# Patient Record
Sex: MALE | Race: WHITE | NOT HISPANIC OR LATINO | URBAN - METROPOLITAN AREA
[De-identification: names, ages, dates, MRNs, and addresses within clinical notes are randomized per-mention and may not be internally consistent; named-entity substitution may affect disease eponyms.]

---

## 2019-02-26 ENCOUNTER — OUTPATIENT (OUTPATIENT)
Dept: OUTPATIENT SERVICES | Facility: HOSPITAL | Age: 34
LOS: 1 days | Discharge: HOME | End: 2019-02-26

## 2019-02-26 DIAGNOSIS — R07.9 CHEST PAIN, UNSPECIFIED: ICD-10-CM

## 2024-08-22 PROBLEM — Z00.00 ENCOUNTER FOR PREVENTIVE HEALTH EXAMINATION: Status: ACTIVE | Noted: 2024-08-22

## 2024-08-27 ENCOUNTER — APPOINTMENT (OUTPATIENT)
Dept: UROLOGY | Facility: CLINIC | Age: 39
End: 2024-08-27
Payer: COMMERCIAL

## 2024-08-27 VITALS
HEART RATE: 102 BPM | DIASTOLIC BLOOD PRESSURE: 79 MMHG | BODY MASS INDEX: 31.39 KG/M2 | WEIGHT: 200 LBS | HEIGHT: 67 IN | SYSTOLIC BLOOD PRESSURE: 132 MMHG | OXYGEN SATURATION: 96 %

## 2024-08-27 DIAGNOSIS — Z80.8 FAMILY HISTORY OF MALIGNANT NEOPLASM OF OTHER ORGANS OR SYSTEMS: ICD-10-CM

## 2024-08-27 DIAGNOSIS — N20.0 CALCULUS OF KIDNEY: ICD-10-CM

## 2024-08-27 DIAGNOSIS — Z80.1 FAMILY HISTORY OF MALIGNANT NEOPLASM OF TRACHEA, BRONCHUS AND LUNG: ICD-10-CM

## 2024-08-27 DIAGNOSIS — Z83.3 FAMILY HISTORY OF DIABETES MELLITUS: ICD-10-CM

## 2024-08-27 DIAGNOSIS — Z82.5 FAMILY HISTORY OF ASTHMA AND OTHER CHRONIC LOWER RESPIRATORY DISEASES: ICD-10-CM

## 2024-08-27 DIAGNOSIS — Z80.52 FAMILY HISTORY OF MALIGNANT NEOPLASM OF BLADDER: ICD-10-CM

## 2024-08-27 PROCEDURE — 99203 OFFICE O/P NEW LOW 30 MIN: CPT

## 2024-08-28 RX ORDER — TOPIRAMATE 100 MG/1
100 TABLET, FILM COATED ORAL
Refills: 0 | Status: ACTIVE | COMMUNITY

## 2024-08-28 NOTE — PHYSICAL EXAM
[Normal Appearance] : normal appearance [General Appearance - In No Acute Distress] : no acute distress [Abdomen Soft] : soft [Abdomen Tenderness] : non-tender [Normal Station and Gait] : the gait and station were normal for the patient's age [Oriented To Time, Place, And Person] : oriented to person, place, and time [Affect] : the affect was normal

## 2024-09-02 NOTE — HISTORY OF PRESENT ILLNESS
[FreeTextEntry1] : 38-year-old male who comes in for further evaluation of renal stones. Patient had an episode last week of right lower abdominal pain and lower mid back pain which lasted approximately 35minutes. The pain spontaneously resolved, and he has had no pain since. He saw a GI doctor who ordered an abdominal ultrasound which shows bilateral renal stones. He denies any history of kidney stones, dysuria, hematuria, or flank pain.  PMHx: none PSHx: hernia repair Allergies: NKDA Family Hx: father kidney stones   US kidney and bladder 8/24/2024: - Bilateral nonobstructing renal calculi with no hydronephrosis.   Labs 8/23/2024: BUN 18 Cr 1.04 HbA1C 5.0 PSA 0.35

## 2024-09-02 NOTE — ASSESSMENT
[FreeTextEntry1] : 38-year-old male seen for bilateral renal stones seen on ultrasound. It is unclear if his pain was related to the stones or if he was passing a small stone at that time. We had a long discussion regarding the ultrasound findings and the unreliability of ultrasounds for stones.US/Labs reviewewd.  He is scheduled for a CT scan in September but he is unsure if this is with contrast or not. I've provided him with a script for a non-contrast CT scan to ensure we get non-contrast images to better evaluate the stones. He will follow up after the CT scan to discuss findings. If he has any significant pain, nausea/vomiting, or fevers, he will go to the ER to get a CT scan urgently. All of their questions were otherwise answered. Seen with KAYLYNN Avelar. Total time=30 min.

## 2024-09-16 ENCOUNTER — APPOINTMENT (OUTPATIENT)
Dept: GASTROENTEROLOGY | Facility: CLINIC | Age: 39
End: 2024-09-16

## 2024-09-30 ENCOUNTER — APPOINTMENT (OUTPATIENT)
Dept: UROLOGY | Facility: CLINIC | Age: 39
End: 2024-09-30
Payer: COMMERCIAL

## 2024-09-30 VITALS
HEIGHT: 67 IN | SYSTOLIC BLOOD PRESSURE: 128 MMHG | DIASTOLIC BLOOD PRESSURE: 74 MMHG | WEIGHT: 200 LBS | RESPIRATION RATE: 18 BRPM | BODY MASS INDEX: 31.39 KG/M2 | OXYGEN SATURATION: 98 % | HEART RATE: 88 BPM

## 2024-09-30 DIAGNOSIS — N20.0 CALCULUS OF KIDNEY: ICD-10-CM

## 2024-09-30 PROCEDURE — G2211 COMPLEX E/M VISIT ADD ON: CPT | Mod: NC

## 2024-09-30 PROCEDURE — 99214 OFFICE O/P EST MOD 30 MIN: CPT

## 2024-10-07 NOTE — HISTORY OF PRESENT ILLNESS
[FreeTextEntry1] :  39 year old male patient seen accompanied by his wife in follow-up with stone disease. I have reviewed his CT in its entirety, which shows a 5x7mm right sided stone in the midportion of the kidney. I do not see any other significant enough stones that warrant treatment. There is a 1 mm or so stone in the lower part of the left kidney. No hydronephrosis or evidence of obstruction on either side.  He is having no pain.    CT Abdomen/pelvis from 9/13/24:  5.4 x 6.8 mm nonobstructing calculus mid to lower pole right kidney. No right hydronephrosis or hydroureter. Punctate nonobstructing calculus lower pole left kidney. No left hydronephrosis or hydroureter. no additional CT evidence of right or left sided nephroureterolithiasis.

## 2024-10-07 NOTE — ASSESSMENT
[FreeTextEntry1] :  39 year old male patient with right 5x7 mm stone, fairly hard, probably about [id number] Hounsfield units.  We discussed these issues at length. We discussed options including shockwave lithotripsy versus continued observation versus ureteroscopy with laser lithotripsy. He would like to proceed with the shockwave lithotripsy. He understands the risks of bleeding and that he would have to pass stone fragments or sand. He would like to proceed and we will make arrangements likely for November.  All of the patient's questions were otherwise answered. Total time=30 min.   The submitted E/M billing level for this visit reflects the total time spent on the day of the visit including face-to-face time spent with the patient, non-face-to-face review of medical records and relevant information, documentation, and asynchronous communication with the patient after a visit via phone, email, or patients EHR portal after the visit. The medical records reviewed are either scanned into the chart or reviewed with the patient using a patients electronic medical records portal for patients with records not available to [facility] via electronic transmission platforms from other institutions and labs. Time spent counseling and performing coordination of care was also included in determining the appropriate EM billing level.   I have reviewed and verified information regarding the chief complaint and history recorded by the ancillary staff and/or the patient. I have independently reviewed and interpreted tests performed by other physicians and facilities as necessary.   I have discussed with the patient differential diagnosis, reason for auxiliary tests if ordered, risks, benefits, alternatives, and complications of each form of therapy were discussed.  I personally performed the services described in the documentation, reviewed the documentation recorded by the scribe in my presence, and it accurately and completely records my words and actions.

## 2024-10-07 NOTE — ASSESSMENT
[FreeTextEntry1] :  39 year old male patient with right 5x7 mm stone, fairly hard, probably about [phone] Hounsfield units.  We discussed these issues at length. We discussed options including shockwave lithotripsy versus continued observation versus ureteroscopy with laser lithotripsy. He would like to proceed with the shockwave lithotripsy. He understands the risks of bleeding and that he would have to pass stone fragments or sand. He would like to proceed and we will make arrangements likely for November.  All of the patient's questions were otherwise answered. Total time=30 min.   The submitted E/M billing level for this visit reflects the total time spent on the day of the visit including face-to-face time spent with the patient, non-face-to-face review of medical records and relevant information, documentation, and asynchronous communication with the patient after a visit via phone, email, or patients EHR portal after the visit. The medical records reviewed are either scanned into the chart or reviewed with the patient using a patients electronic medical records portal for patients with records not available to [facility] via electronic transmission platforms from other institutions and labs. Time spent counseling and performing coordination of care was also included in determining the appropriate EM billing level.   I have reviewed and verified information regarding the chief complaint and history recorded by the ancillary staff and/or the patient. I have independently reviewed and interpreted tests performed by other physicians and facilities as necessary.   I have discussed with the patient differential diagnosis, reason for auxiliary tests if ordered, risks, benefits, alternatives, and complications of each form of therapy were discussed.  I personally performed the services described in the documentation, reviewed the documentation recorded by the scribe in my presence, and it accurately and completely records my words and actions.

## 2024-10-07 NOTE — ADDENDUM
[FreeTextEntry1] :  SKYLAR SOTELO, am scribing for and in the presence of  in the following sections: HISTORY OF PRESENT ILLNESS, PAST MEDICAL/FAMILY/SOCIAL HISTORY, REVIEW OF SYSTEMS, VITAL SIGNS, PHYSICAL EXAM, ASSESSMENT/PLAN on 09/30/2024.

## 2024-11-06 ENCOUNTER — OUTPATIENT (OUTPATIENT)
Dept: OUTPATIENT SERVICES | Facility: HOSPITAL | Age: 39
LOS: 1 days | End: 2024-11-06
Payer: COMMERCIAL

## 2024-11-06 VITALS
TEMPERATURE: 98 F | OXYGEN SATURATION: 99 % | HEART RATE: 86 BPM | HEIGHT: 67 IN | RESPIRATION RATE: 16 BRPM | DIASTOLIC BLOOD PRESSURE: 75 MMHG | SYSTOLIC BLOOD PRESSURE: 128 MMHG | WEIGHT: 205.03 LBS

## 2024-11-06 DIAGNOSIS — Z01.818 ENCOUNTER FOR OTHER PREPROCEDURAL EXAMINATION: ICD-10-CM

## 2024-11-06 DIAGNOSIS — Z98.890 OTHER SPECIFIED POSTPROCEDURAL STATES: Chronic | ICD-10-CM

## 2024-11-06 DIAGNOSIS — N20.0 CALCULUS OF KIDNEY: ICD-10-CM

## 2024-11-06 LAB
ALBUMIN SERPL ELPH-MCNC: 4.2 G/DL — SIGNIFICANT CHANGE UP (ref 3.5–5.2)
ALP SERPL-CCNC: 88 U/L — SIGNIFICANT CHANGE UP (ref 30–115)
ALT FLD-CCNC: 89 U/L — HIGH (ref 0–41)
ANION GAP SERPL CALC-SCNC: 15 MMOL/L — HIGH (ref 7–14)
APPEARANCE UR: CLEAR — SIGNIFICANT CHANGE UP
AST SERPL-CCNC: 37 U/L — SIGNIFICANT CHANGE UP (ref 0–41)
BASOPHILS # BLD AUTO: 0.07 K/UL — SIGNIFICANT CHANGE UP (ref 0–0.2)
BASOPHILS NFR BLD AUTO: 0.8 % — SIGNIFICANT CHANGE UP (ref 0–1)
BILIRUB SERPL-MCNC: 0.3 MG/DL — SIGNIFICANT CHANGE UP (ref 0.2–1.2)
BILIRUB UR-MCNC: NEGATIVE — SIGNIFICANT CHANGE UP
BUN SERPL-MCNC: 16 MG/DL — SIGNIFICANT CHANGE UP (ref 10–20)
CALCIUM SERPL-MCNC: 9.1 MG/DL — SIGNIFICANT CHANGE UP (ref 8.4–10.5)
CHLORIDE SERPL-SCNC: 105 MMOL/L — SIGNIFICANT CHANGE UP (ref 98–110)
CO2 SERPL-SCNC: 21 MMOL/L — SIGNIFICANT CHANGE UP (ref 17–32)
COLOR SPEC: YELLOW — SIGNIFICANT CHANGE UP
CREAT SERPL-MCNC: 1.1 MG/DL — SIGNIFICANT CHANGE UP (ref 0.7–1.5)
DIFF PNL FLD: NEGATIVE — SIGNIFICANT CHANGE UP
EGFR: 87 ML/MIN/1.73M2 — SIGNIFICANT CHANGE UP
EOSINOPHIL # BLD AUTO: 0.12 K/UL — SIGNIFICANT CHANGE UP (ref 0–0.7)
EOSINOPHIL NFR BLD AUTO: 1.4 % — SIGNIFICANT CHANGE UP (ref 0–8)
GLUCOSE SERPL-MCNC: 161 MG/DL — HIGH (ref 70–99)
GLUCOSE UR QL: NEGATIVE MG/DL — SIGNIFICANT CHANGE UP
HCT VFR BLD CALC: 51.5 % — SIGNIFICANT CHANGE UP (ref 42–52)
HGB BLD-MCNC: 16.9 G/DL — SIGNIFICANT CHANGE UP (ref 14–18)
IMM GRANULOCYTES NFR BLD AUTO: 0.7 % — HIGH (ref 0.1–0.3)
KETONES UR-MCNC: ABNORMAL MG/DL
LEUKOCYTE ESTERASE UR-ACNC: NEGATIVE — SIGNIFICANT CHANGE UP
LYMPHOCYTES # BLD AUTO: 1.77 K/UL — SIGNIFICANT CHANGE UP (ref 1.2–3.4)
LYMPHOCYTES # BLD AUTO: 21.1 % — SIGNIFICANT CHANGE UP (ref 20.5–51.1)
MCHC RBC-ENTMCNC: 26.7 PG — LOW (ref 27–31)
MCHC RBC-ENTMCNC: 32.8 G/DL — SIGNIFICANT CHANGE UP (ref 32–37)
MCV RBC AUTO: 81.5 FL — SIGNIFICANT CHANGE UP (ref 80–94)
MONOCYTES # BLD AUTO: 0.69 K/UL — HIGH (ref 0.1–0.6)
MONOCYTES NFR BLD AUTO: 8.2 % — SIGNIFICANT CHANGE UP (ref 1.7–9.3)
NEUTROPHILS # BLD AUTO: 5.66 K/UL — SIGNIFICANT CHANGE UP (ref 1.4–6.5)
NEUTROPHILS NFR BLD AUTO: 67.8 % — SIGNIFICANT CHANGE UP (ref 42.2–75.2)
NITRITE UR-MCNC: NEGATIVE — SIGNIFICANT CHANGE UP
NRBC # BLD: 0 /100 WBCS — SIGNIFICANT CHANGE UP (ref 0–0)
PH UR: 5.5 — SIGNIFICANT CHANGE UP (ref 5–8)
PLATELET # BLD AUTO: 246 K/UL — SIGNIFICANT CHANGE UP (ref 130–400)
PMV BLD: 11.3 FL — HIGH (ref 7.4–10.4)
POTASSIUM SERPL-MCNC: 4 MMOL/L — SIGNIFICANT CHANGE UP (ref 3.5–5)
POTASSIUM SERPL-SCNC: 4 MMOL/L — SIGNIFICANT CHANGE UP (ref 3.5–5)
PROT SERPL-MCNC: 6.4 G/DL — SIGNIFICANT CHANGE UP (ref 6–8)
PROT UR-MCNC: SIGNIFICANT CHANGE UP MG/DL
RBC # BLD: 6.32 M/UL — HIGH (ref 4.7–6.1)
RBC # FLD: 16.3 % — HIGH (ref 11.5–14.5)
SODIUM SERPL-SCNC: 141 MMOL/L — SIGNIFICANT CHANGE UP (ref 135–146)
SP GR SPEC: 1.03 — SIGNIFICANT CHANGE UP (ref 1–1.03)
UROBILINOGEN FLD QL: 1 MG/DL — SIGNIFICANT CHANGE UP (ref 0.2–1)
WBC # BLD: 8.37 K/UL — SIGNIFICANT CHANGE UP (ref 4.8–10.8)
WBC # FLD AUTO: 8.37 K/UL — SIGNIFICANT CHANGE UP (ref 4.8–10.8)

## 2024-11-06 PROCEDURE — 85025 COMPLETE CBC W/AUTO DIFF WBC: CPT

## 2024-11-06 PROCEDURE — 80053 COMPREHEN METABOLIC PANEL: CPT

## 2024-11-06 PROCEDURE — 87086 URINE CULTURE/COLONY COUNT: CPT

## 2024-11-06 PROCEDURE — 36415 COLL VENOUS BLD VENIPUNCTURE: CPT

## 2024-11-06 PROCEDURE — 99214 OFFICE O/P EST MOD 30 MIN: CPT | Mod: 25

## 2024-11-06 PROCEDURE — 81003 URINALYSIS AUTO W/O SCOPE: CPT

## 2024-11-06 NOTE — H&P PST ADULT - NSANTHOSAYNRD_GEN_A_CORE
No. EMILEE screening performed.  STOP BANG Legend: 0-2 = LOW Risk; 3-4 = INTERMEDIATE Risk; 5-8 = HIGH Risk

## 2024-11-06 NOTE — H&P PST ADULT - NSICDXFAMILYHX_GEN_ALL_CORE_FT
FAMILY HISTORY:  Father  Still living? No  FH: lung cancer, Age at diagnosis: Age Unknown  FHx: colon cancer, Age at diagnosis: Age Unknown

## 2024-11-06 NOTE — H&P PST ADULT - REASON FOR ADMISSION
Case Type: OP  Suite: [facility]  Proceduralist: Constantine Lima  Confirmed Surgery Date Time: 11-   PAST Date Time: 11- - 14:30  Procedure: RIGHT EXTRACORPOREAL SHOCKWAVE LITHOTRIPSY  Laterality: Right  Length of Procedure: 60 Minutes  Anesthesia Type: General

## 2024-11-06 NOTE — H&P PST ADULT - HISTORY OF PRESENT ILLNESS
Patient is a 38 yo male  with PMH of Migraine, seasonal asthma stable  who presents to pretesting for the preparation of the  above procedure due to right kidney stone since 09/20204. Patient c/o stomach pain and seen by GI MD and sonogram of abdomen and pelvis done and it shown kidney stone.   Patient denies any cp, sob, palpitations, fever, cough, URI, abdominal pains, N/V, UTI, Rashes or open wounds.  As per patient exercise tolerance of 20 fos walks with out sob  Patient denies any s/s covid 19 and reports no contact with known positive people.   Anesthesia Alert  NO--Difficult Airway  NO--History of neck surgery or radiation  NO--Limited ROM of neck  NO--History of Malignant hyperthermia  NO--Personal or family history of Pseudocholinesterase deficiency  NO--Prior Anesthesia Complication  NO--Latex Allergy  NO--Loose teeth  NO--History of Rheumatoid Arthritis  NO--EMILEE  NO--Risk of Bleedings   Pt instructed to stop vitamins/supplements/herbal medications for one week prior to surgery  As per patient this is the complete medical, surgical history and medications.  Duke Activity Status Index (DASI) from [employer]  on 11/6/2024  ** All calculations should be rechecked by clinician prior to use **  RESULT SUMMARY:  58.2 points  The higher the score (maximum 58.2), the higher the functional status.  9.89 METs  INPUTS:  Take care of self —> 2.75 = Yes  Walk indoors —> 1.75 = Yes  Walk 1&ndash;2 blocks on level ground —> 2.75 = Yes  Climb a flight of stairs or walk up a hill —> 5.5 = Yes  Run a short distance —> 8 = Yes  Do light work around the house —> 2.7 = Yes  Do moderate work around the house —> 3.5 = Yes  Do heavy work around the house —> 8 = Yes  Do yardwork —> 4.5 = Yes  Have sexual relations —> 5.25 = Yes  Participate in moderate recreational activities —> 6 = Yes  Participate in strenuous sports —> 7.5 = Yes  Revised Cardiac Risk Index for Pre-Operative Risk from [employer]  on 11/6/2024  ** All calculations should be rechecked by clinician prior to use **  RESULT SUMMARY:  1 points  Class II Risk  6.0 %  30-day risk of death, MI, or cardiac arrest  From [employer] 2017. These numbers are higher than those from the original study (Antonio 1999). See Evidence for details.  INPUTS:  Elevated-risk surgery —> 1 = Yes  History of ischemic heart disease —> 0 = No  History of congestive heart failure —> 0 = No  History of cerebrovascular disease —> 0 = No  Pre-operative treatment with insulin —> 0 = No  Pre-operative creatinine >2 mg/dL / 176.8 µmol/L —> 0 = No

## 2024-11-07 DIAGNOSIS — Z01.818 ENCOUNTER FOR OTHER PREPROCEDURAL EXAMINATION: ICD-10-CM

## 2024-11-07 DIAGNOSIS — N20.0 CALCULUS OF KIDNEY: ICD-10-CM

## 2024-11-07 LAB
CULTURE RESULTS: SIGNIFICANT CHANGE UP
SPECIMEN SOURCE: SIGNIFICANT CHANGE UP

## 2024-11-13 ENCOUNTER — RESULT REVIEW (OUTPATIENT)
Age: 39
End: 2024-11-13

## 2024-11-13 ENCOUNTER — NON-APPOINTMENT (OUTPATIENT)
Age: 39
End: 2024-11-13

## 2024-11-13 ENCOUNTER — TRANSCRIPTION ENCOUNTER (OUTPATIENT)
Age: 39
End: 2024-11-13

## 2024-11-13 ENCOUNTER — APPOINTMENT (OUTPATIENT)
Dept: UROLOGY | Facility: HOSPITAL | Age: 39
End: 2024-11-13

## 2024-11-13 ENCOUNTER — OUTPATIENT (OUTPATIENT)
Dept: OUTPATIENT SERVICES | Facility: HOSPITAL | Age: 39
LOS: 1 days | Discharge: HOME | End: 2024-11-13
Payer: COMMERCIAL

## 2024-11-13 VITALS
HEIGHT: 68 IN | TEMPERATURE: 98 F | RESPIRATION RATE: 17 BRPM | DIASTOLIC BLOOD PRESSURE: 90 MMHG | OXYGEN SATURATION: 98 % | WEIGHT: 199.96 LBS | HEART RATE: 83 BPM | SYSTOLIC BLOOD PRESSURE: 135 MMHG

## 2024-11-13 VITALS — HEART RATE: 63 BPM | SYSTOLIC BLOOD PRESSURE: 109 MMHG | DIASTOLIC BLOOD PRESSURE: 69 MMHG

## 2024-11-13 DIAGNOSIS — Z98.890 OTHER SPECIFIED POSTPROCEDURAL STATES: Chronic | ICD-10-CM

## 2024-11-13 DIAGNOSIS — N20.0 CALCULUS OF KIDNEY: ICD-10-CM

## 2024-11-13 PROCEDURE — 50590 FRAGMENTING OF KIDNEY STONE: CPT | Mod: RT

## 2024-11-13 PROCEDURE — 74018 RADEX ABDOMEN 1 VIEW: CPT

## 2024-11-13 PROCEDURE — 74018 RADEX ABDOMEN 1 VIEW: CPT | Mod: 26

## 2024-11-13 RX ORDER — TOPIRAMATE 200 MG/1
1 TABLET ORAL
Refills: 0 | DISCHARGE

## 2024-11-13 RX ORDER — OXYCODONE AND ACETAMINOPHEN 7.5; 325 MG/1; MG/1
1 TABLET ORAL ONCE
Refills: 0 | Status: DISCONTINUED | OUTPATIENT
Start: 2024-11-13 | End: 2024-11-13

## 2024-11-13 RX ORDER — MORPHINE SULFATE 30 MG/1
2 TABLET, EXTENDED RELEASE ORAL ONCE
Refills: 0 | Status: DISCONTINUED | OUTPATIENT
Start: 2024-11-13 | End: 2024-11-13

## 2024-11-13 RX ORDER — ONDANSETRON HYDROCHLORIDE 2 MG/ML
4 INJECTION, SOLUTION INTRAMUSCULAR; INTRAVENOUS ONCE
Refills: 0 | Status: DISCONTINUED | OUTPATIENT
Start: 2024-11-13 | End: 2024-11-13

## 2024-11-13 RX ORDER — HYDROMORPHONE HCL/0.9% NACL/PF 6 MG/30 ML
0.5 PATIENT CONTROLLED ANALGESIA SYRINGE INTRAVENOUS
Refills: 0 | Status: DISCONTINUED | OUTPATIENT
Start: 2024-11-13 | End: 2024-11-13

## 2024-11-13 NOTE — CHART NOTE - NSCHARTNOTEFT_GEN_A_CORE
PACU ANESTHESIA ADMISSION NOTE      Procedure: ESWL      Post op diagnosis:      ____  Intubated  TV:______       Rate: ______      FiO2: ______    x____  Patent Airway    _x___  Full return of protective reflexes    _x___  Full recovery from anesthesia / back to baseline     Vitals:   T:  98         R: 16                 BP:  121 /75                Sat:   98                P: 80      Mental Status:  x____ Awake  x _____ Alert   _____ Drowsy   _____ Sedated    Nausea/Vomiting:  ____ NO  ______Yes,   See Post - Op Orders          Pain Scale (0-10):  _____    Treatment: ____ None    ____ See Post - Op/PCA Orders    Post - Operative Fluids:   ____ Oral   ____ See Post - Op Orders    Plan: Discharge:  x ____Home       _____Floor     _____Critical Care    _____  Other:_________________    Comments:

## 2024-11-13 NOTE — ASU DISCHARGE PLAN (ADULT/PEDIATRIC) - NS MD DC FALL RISK RISK
For information on Fall & Injury Prevention, visit: [email or web address] OR  [email or web address] OR  https://www.cdc.gov/steadi/patient.html

## 2024-11-13 NOTE — ASU DISCHARGE PLAN (ADULT/PEDIATRIC) - FINANCIAL ASSISTANCE
[facility] provides services at a reduced cost to those who are determined to be eligible through [facility]’s financial assistance program. Information regarding [facility]’s financial assistance program can be found by going to [email or web address] or by calling [phone].

## 2024-11-13 NOTE — ASU PATIENT PROFILE, ADULT - FALL HARM RISK - HARM RISK INTERVENTIONS

## 2024-11-14 ENCOUNTER — NON-APPOINTMENT (OUTPATIENT)
Age: 39
End: 2024-11-14

## 2024-11-14 PROBLEM — N20.0 CALCULUS OF KIDNEY: Chronic | Status: ACTIVE | Noted: 2024-11-13

## 2024-11-14 PROBLEM — J45.909 UNSPECIFIED ASTHMA, UNCOMPLICATED: Chronic | Status: ACTIVE | Noted: 2024-11-06

## 2024-11-18 DIAGNOSIS — N20.0 CALCULUS OF KIDNEY: ICD-10-CM

## 2024-11-18 DIAGNOSIS — G43.909 MIGRAINE, UNSPECIFIED, NOT INTRACTABLE, WITHOUT STATUS MIGRAINOSUS: ICD-10-CM

## 2024-11-18 DIAGNOSIS — J45.998 OTHER ASTHMA: ICD-10-CM

## 2025-01-10 ENCOUNTER — APPOINTMENT (OUTPATIENT)
Dept: UROLOGY | Facility: CLINIC | Age: 40
End: 2025-01-10
